# Patient Record
Sex: MALE | Race: BLACK OR AFRICAN AMERICAN
[De-identification: names, ages, dates, MRNs, and addresses within clinical notes are randomized per-mention and may not be internally consistent; named-entity substitution may affect disease eponyms.]

---

## 2021-11-26 ENCOUNTER — HOSPITAL ENCOUNTER (EMERGENCY)
Dept: HOSPITAL 54 - ER | Age: 31
Discharge: HOME | End: 2021-11-26
Payer: SELF-PAY

## 2021-11-26 VITALS — WEIGHT: 140 LBS | HEIGHT: 74 IN | BODY MASS INDEX: 17.97 KG/M2

## 2021-11-26 VITALS — DIASTOLIC BLOOD PRESSURE: 72 MMHG | SYSTOLIC BLOOD PRESSURE: 119 MMHG
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
## 2021-11-26 DIAGNOSIS — R07.89: ICD-10-CM

## 2021-11-26 DIAGNOSIS — M54.2: Primary | ICD-10-CM
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
